# Patient Record
Sex: FEMALE | Race: WHITE | Employment: OTHER | URBAN - METROPOLITAN AREA
[De-identification: names, ages, dates, MRNs, and addresses within clinical notes are randomized per-mention and may not be internally consistent; named-entity substitution may affect disease eponyms.]

---

## 2021-04-22 ENCOUNTER — HOSPITAL ENCOUNTER (EMERGENCY)
Age: 76
Discharge: HOME | End: 2021-04-22
Attending: EMERGENCY MEDICINE
Payer: MEDICARE

## 2021-04-22 ENCOUNTER — APPOINTMENT (OUTPATIENT)
Dept: GENERAL RADIOLOGY | Age: 76
End: 2021-04-22
Payer: MEDICARE

## 2021-04-22 ENCOUNTER — APPOINTMENT (OUTPATIENT)
Dept: CT IMAGING | Age: 76
End: 2021-04-22
Payer: MEDICARE

## 2021-04-22 VITALS
HEART RATE: 68 BPM | HEIGHT: 62 IN | TEMPERATURE: 97.9 F | RESPIRATION RATE: 16 BRPM | OXYGEN SATURATION: 100 % | SYSTOLIC BLOOD PRESSURE: 138 MMHG | DIASTOLIC BLOOD PRESSURE: 65 MMHG | WEIGHT: 178 LBS | BODY MASS INDEX: 32.76 KG/M2

## 2021-04-22 DIAGNOSIS — R79.89 ELEVATED LFTS: ICD-10-CM

## 2021-04-22 DIAGNOSIS — R10.10 PAIN OF UPPER ABDOMEN: Primary | ICD-10-CM

## 2021-04-22 DIAGNOSIS — E83.42 HYPOMAGNESEMIA: ICD-10-CM

## 2021-04-22 LAB
ABSOLUTE EOS #: 0.1 K/UL (ref 0–0.4)
ABSOLUTE IMMATURE GRANULOCYTE: ABNORMAL K/UL (ref 0–0.3)
ABSOLUTE LYMPH #: 1.3 K/UL (ref 1–4.8)
ABSOLUTE MONO #: 0.6 K/UL (ref 0.1–1.2)
ALBUMIN SERPL-MCNC: 4.1 G/DL (ref 3.5–5.2)
ALBUMIN/GLOBULIN RATIO: 1.5 (ref 1–2.5)
ALP BLD-CCNC: 71 U/L (ref 35–104)
ALT SERPL-CCNC: 99 U/L (ref 5–33)
ANION GAP SERPL CALCULATED.3IONS-SCNC: 12 MMOL/L (ref 9–17)
AST SERPL-CCNC: 207 U/L
BASOPHILS # BLD: 1 % (ref 0–2)
BASOPHILS ABSOLUTE: 0.1 K/UL (ref 0–0.2)
BILIRUB SERPL-MCNC: 0.96 MG/DL (ref 0.3–1.2)
BILIRUBIN URINE: NEGATIVE
BUN BLDV-MCNC: 19 MG/DL (ref 8–23)
BUN/CREAT BLD: ABNORMAL (ref 9–20)
CALCIUM SERPL-MCNC: 10.1 MG/DL (ref 8.6–10.4)
CHLORIDE BLD-SCNC: 104 MMOL/L (ref 98–107)
CO2: 21 MMOL/L (ref 20–31)
COLOR: YELLOW
COMMENT UA: ABNORMAL
CREAT SERPL-MCNC: 0.89 MG/DL (ref 0.5–0.9)
D-DIMER QUANTITATIVE: 0.5 MG/L FEU
DIFFERENTIAL TYPE: ABNORMAL
EOSINOPHILS RELATIVE PERCENT: 1 % (ref 1–4)
GFR AFRICAN AMERICAN: >60 ML/MIN
GFR NON-AFRICAN AMERICAN: >60 ML/MIN
GFR SERPL CREATININE-BSD FRML MDRD: ABNORMAL ML/MIN/{1.73_M2}
GFR SERPL CREATININE-BSD FRML MDRD: ABNORMAL ML/MIN/{1.73_M2}
GLUCOSE BLD-MCNC: 180 MG/DL (ref 70–99)
GLUCOSE URINE: ABNORMAL
HCT VFR BLD CALC: 34.9 % (ref 36–46)
HEMOGLOBIN: 11.2 G/DL (ref 12–16)
IMMATURE GRANULOCYTES: ABNORMAL %
KETONES, URINE: NEGATIVE
LEUKOCYTE ESTERASE, URINE: NEGATIVE
LIPASE: 76 U/L (ref 13–60)
LYMPHOCYTES # BLD: 13 % (ref 24–44)
MAGNESIUM: 1.3 MG/DL (ref 1.6–2.6)
MCH RBC QN AUTO: 29.7 PG (ref 26–34)
MCHC RBC AUTO-ENTMCNC: 32 G/DL (ref 31–37)
MCV RBC AUTO: 92.8 FL (ref 80–100)
MONOCYTES # BLD: 6 % (ref 2–11)
NITRITE, URINE: NEGATIVE
NRBC AUTOMATED: ABNORMAL PER 100 WBC
PDW BLD-RTO: 15 % (ref 12.5–15.4)
PH UA: 5.5 (ref 5–8)
PLATELET # BLD: 262 K/UL (ref 140–450)
PLATELET ESTIMATE: ABNORMAL
PMV BLD AUTO: 10 FL (ref 6–12)
POTASSIUM SERPL-SCNC: 4.5 MMOL/L (ref 3.7–5.3)
PROTEIN UA: NEGATIVE
RBC # BLD: 3.77 M/UL (ref 4–5.2)
RBC # BLD: ABNORMAL 10*6/UL
SEG NEUTROPHILS: 79 % (ref 36–66)
SEGMENTED NEUTROPHILS ABSOLUTE COUNT: 7.5 K/UL (ref 1.8–7.7)
SODIUM BLD-SCNC: 137 MMOL/L (ref 135–144)
SPECIFIC GRAVITY UA: 1.01 (ref 1–1.03)
TOTAL PROTEIN: 6.8 G/DL (ref 6.4–8.3)
TROPONIN INTERP: NORMAL
TROPONIN T: NORMAL NG/ML
TROPONIN, HIGH SENSITIVITY: <6 NG/L (ref 0–14)
TURBIDITY: CLEAR
URINE HGB: NEGATIVE
UROBILINOGEN, URINE: NORMAL
WBC # BLD: 9.5 K/UL (ref 3.5–11)
WBC # BLD: ABNORMAL 10*3/UL

## 2021-04-22 PROCEDURE — 96366 THER/PROPH/DIAG IV INF ADDON: CPT

## 2021-04-22 PROCEDURE — 99285 EMERGENCY DEPT VISIT HI MDM: CPT

## 2021-04-22 PROCEDURE — 80053 COMPREHEN METABOLIC PANEL: CPT

## 2021-04-22 PROCEDURE — 83690 ASSAY OF LIPASE: CPT

## 2021-04-22 PROCEDURE — 96365 THER/PROPH/DIAG IV INF INIT: CPT

## 2021-04-22 PROCEDURE — 6370000000 HC RX 637 (ALT 250 FOR IP): Performed by: PHYSICIAN ASSISTANT

## 2021-04-22 PROCEDURE — 93005 ELECTROCARDIOGRAM TRACING: CPT | Performed by: PHYSICIAN ASSISTANT

## 2021-04-22 PROCEDURE — 85379 FIBRIN DEGRADATION QUANT: CPT

## 2021-04-22 PROCEDURE — 36415 COLL VENOUS BLD VENIPUNCTURE: CPT

## 2021-04-22 PROCEDURE — 6360000004 HC RX CONTRAST MEDICATION: Performed by: EMERGENCY MEDICINE

## 2021-04-22 PROCEDURE — 71260 CT THORAX DX C+: CPT

## 2021-04-22 PROCEDURE — 83735 ASSAY OF MAGNESIUM: CPT

## 2021-04-22 PROCEDURE — 85025 COMPLETE CBC W/AUTO DIFF WBC: CPT

## 2021-04-22 PROCEDURE — 2580000003 HC RX 258: Performed by: EMERGENCY MEDICINE

## 2021-04-22 PROCEDURE — 71045 X-RAY EXAM CHEST 1 VIEW: CPT

## 2021-04-22 PROCEDURE — 81003 URINALYSIS AUTO W/O SCOPE: CPT

## 2021-04-22 PROCEDURE — 84484 ASSAY OF TROPONIN QUANT: CPT

## 2021-04-22 PROCEDURE — 6360000002 HC RX W HCPCS: Performed by: PHYSICIAN ASSISTANT

## 2021-04-22 RX ORDER — OMEPRAZOLE 20 MG/1
20 CAPSULE, DELAYED RELEASE ORAL DAILY
COMMUNITY

## 2021-04-22 RX ORDER — LOSARTAN POTASSIUM 100 MG/1
100 TABLET ORAL DAILY
COMMUNITY

## 2021-04-22 RX ORDER — CARVEDILOL 25 MG/1
25 TABLET ORAL 2 TIMES DAILY WITH MEALS
COMMUNITY

## 2021-04-22 RX ORDER — 0.9 % SODIUM CHLORIDE 0.9 %
80 INTRAVENOUS SOLUTION INTRAVENOUS ONCE
Status: COMPLETED | OUTPATIENT
Start: 2021-04-22 | End: 2021-04-22

## 2021-04-22 RX ORDER — FOLIC ACID 1 MG/1
1 TABLET ORAL DAILY
COMMUNITY

## 2021-04-22 RX ORDER — MAGNESIUM SULFATE IN WATER 40 MG/ML
2000 INJECTION, SOLUTION INTRAVENOUS ONCE
Status: COMPLETED | OUTPATIENT
Start: 2021-04-22 | End: 2021-04-22

## 2021-04-22 RX ORDER — SODIUM CHLORIDE 0.9 % (FLUSH) 0.9 %
10 SYRINGE (ML) INJECTION PRN
Status: DISCONTINUED | OUTPATIENT
Start: 2021-04-22 | End: 2021-04-22 | Stop reason: HOSPADM

## 2021-04-22 RX ORDER — MAGNESIUM HYDROXIDE/ALUMINUM HYDROXICE/SIMETHICONE 120; 1200; 1200 MG/30ML; MG/30ML; MG/30ML
30 SUSPENSION ORAL ONCE
Status: COMPLETED | OUTPATIENT
Start: 2021-04-22 | End: 2021-04-22

## 2021-04-22 RX ORDER — ASPIRIN 81 MG/1
81 TABLET ORAL NIGHTLY
COMMUNITY

## 2021-04-22 RX ORDER — AMLODIPINE BESYLATE 10 MG/1
10 TABLET ORAL DAILY
COMMUNITY

## 2021-04-22 RX ADMIN — ALUMINUM HYDROXIDE, MAGNESIUM HYDROXIDE, AND SIMETHICONE 30 ML: 200; 200; 20 SUSPENSION ORAL at 17:47

## 2021-04-22 RX ADMIN — MAGNESIUM SULFATE 2000 MG: 2 INJECTION INTRAVENOUS at 17:44

## 2021-04-22 RX ADMIN — IOPAMIDOL 75 ML: 755 INJECTION, SOLUTION INTRAVENOUS at 17:30

## 2021-04-22 RX ADMIN — SODIUM CHLORIDE 80 ML: 9 INJECTION, SOLUTION INTRAVENOUS at 17:30

## 2021-04-22 RX ADMIN — SODIUM CHLORIDE, PRESERVATIVE FREE 10 ML: 5 INJECTION INTRAVENOUS at 17:30

## 2021-04-22 ASSESSMENT — PAIN DESCRIPTION - LOCATION: LOCATION: ABDOMEN;BACK

## 2021-04-22 ASSESSMENT — PAIN SCALES - GENERAL: PAINLEVEL_OUTOF10: 4

## 2021-04-22 NOTE — ED PROVIDER NOTES
Attending Supervisory Note/Shared Visit   I have personally performed a face to face diagnostic evaluation on this patient. I have reviewed the mid-levels findings and agree.         (Please note that portions of this note were completed with a voice recognition program.  Efforts were made to edit the dictations but occasionally words are mis-transcribed.)    Elisabeth Lemon MD  Attending Emergency Physician        Elisabeth Lemon MD  04/22/21 [id number]

## 2021-04-22 NOTE — ED PROVIDER NOTES
[id number] [facility] ED  [address] [facility] [address] [facility] [place] [address]  Phone: [phone]  Fax: [phone]        Pt Name: Stella Paz  MRN: [id number]  [place] 1945  Date of evaluation: 4/22/21    [address] [place] [address]       Chief Complaint   Patient presents with    Abdominal Pain     radiates to back worse today than in previous episodes       HISTORY OF PRESENT ILLNESS (Location/Symptom, Timing/Onset, Context/Setting, Quality, Duration, Modifying Factors, Severity)      Stella Paz is a 68 y.o. female with PMH of hypertension, type 2 diabetes, psoriatic arthritis, kidney stones who presents to the ED via private auto with abdominal pain and back pain. Patient reports that for the past 2 weeks she has been experiencing intermittent epigastric abdominal pain that radiates into her back. She reports that the pain is occasionally sharp and burning but also feels like a pressure. She reports that she has had this pain in the past however this episode today feels much more severe as the pain has notably worsened since she awoke and has become constant. Denies any pain in her chest, shortness of breath, cough, vomiting, nausea, or diarrhea. Denies any fever, chills, or body aches. She has continued to eat however does notice that the pain is slightly worsened after eating. Patient did not notice any other exacerbating or alleviating factors. Denies any changes with exertion. Denies any cardiac history. She notes that she is also under a lot of stress due to her  recently being diagnosed with cancer and the PET scan in a 2 days. Patient also notes Alba Vaughan it is just anxiety due to the stress. \"  Denies any history of being treated for anxiety, but does note that she does experience this occasionally and more frequently as of now. Denies any other concerns at this time.     PAST MEDICAL / SURGICAL / SOCIAL / FAMILY HISTORY     PMH:  has a past medical history of Diabetes mellitus ([address] and Kidney stones. Surgical History:  has a past surgical history that includes Hysterectomy. Social History:  reports that she has never smoked. She has never used smokeless tobacco. She reports previous alcohol use. She reports that she does not use drugs. Family History: has no family status information on file. family history is not on file. Psychiatric History: None    Allergies: Sulfa antibiotics    Home Medications:   Prior to Admission medications    Medication Sig Start Date End Date Taking? Authorizing Provider   metFORMIN (GLUCOPHAGE) 500 MG tablet Take 2 tablets by mouth nightly    Historical Provider, MD   amLODIPine (NORVASC) 10 MG tablet Take 10 mg by mouth daily    Historical Provider, MD   losartan (COZAAR) 100 MG tablet Take 100 mg by mouth daily    Historical Provider, MD   carvedilol (COREG) 25 MG tablet Take 25 mg by mouth 2 times daily (with meals)    Historical Provider, MD   folic acid (FOLVITE) 1 MG tablet Take 1 tablet by mouth daily    Historical Provider, MD   methotrexate (RHEUMATREX) 2.5 MG chemo tablet Take 2.5 mg by mouth once a week    Historical Provider, MD   omeprazole (PRILOSEC) 20 MG delayed release capsule Take 20 mg by mouth daily    Historical Provider, MD   aspirin 81 MG EC tablet Take 81 mg by mouth nightly    Historical Provider, MD       REVIEW OF SYSTEMS  (2-9 systems for level 4, 10 ormore for level 5)      Review of Systems    Constitutional: See HPI. Eyes: Denies vision changes. HENT: Denies sore throat or neck pain. Respiratory: See HPI. Cardiovascular: See HPI. GI: See HPI. : See HPI. Musculoskeletal: Denies recent trauma. Skin: Denies new rashes or wounds. Neurologic:  Denies new numbness or weakness. Diabetic eye  Psychiatric: Denies sleep disturbances. Presently  All other systems negative except as marked.      PHYSICAL EXAM  (up to 7 for level 4, 8 or more for level 5)      INITIAL VITALS:  height is 5' 2\" (1.575 m) and weight is 80.7 kg (178 lb). Her oral temperature is 97.9 °F (36.6 °C). Her blood pressure is 138/65 and her pulse is 68. Her respiration is 16 and oxygen saturation is 100%. Vital signs reviewed. Physical Exam    General:  Alert, cooperative, well-groomed, well-nourished, appears stated age, and is in no acute distress. Head:  Normocephalic, atraumatic, and without obvious abnormality. Eyes:  Sclerae/conjunctivae clear without injection, pallor, or icterus. Corneas clear without opacities. EOM's intact. ENT: Ears and nose are all without obvious masses lesion or deformity. No oropharynx examination performed due to aerosolization risk during COVID-19 pandemic. Neck: Supple and symmetrical. Trachea midline. No adenopathy. No jugular venous distention. Lungs:   No respiratory distress. Clear to auscultation bilaterally. No wheezes, rhonchi, or rales. Heart:  Regular rate. Regular rhythm. No murmurs, rubs, or gallops. Abdomen:   Normoactive bowel sounds. There is tenderness to palpation of the epigastrium that is reproducible as well as tenderness to the right and left upper quadrants. There is no guarding or rebound. Abdomen is soft and nondistended. No palpable masses. No CVA tenderness. Back: There is no abnormality of the upper back and no tenderness palpation. No midline spine tenderness. Extremities: Warm and dry without erythema or edema. Skin: Soft, good turgor, and well-hydrated. No obvious rashes or lesions. Neurologic: GCS is 15 and no focal deficits are appreciated. Normal gait. Grossly normal motor and sensation. Speech clear. Psychiatric: Normal mood and affect. Normal behavior. Coherent thought process. DIFFERENTIAL DIAGNOSIS / MDM     The patient presents to the Emergency Department with epigastric abdominal pain that radiates into the upper back. Vital signs are stable with slight hypertension at 138/66. Physical exam demonstrates a well-appearing nontoxic female in no acute distress. There is reproducible tenderness to the epigastrium without guarding or rebound. I did consider life-threatening cardiac and pulmonary etiologies but I suspect that this is likely GI related, possibly gastritis, but will plan to obtain pertinent labs, EKG, chest x-ray versus CT scan chest, and scan of the abdomen and reassess symptoms. EKG did not show acute changes. Will give fluids and likely GI cocktail pending results. PLAN (LABS / IMAGING / EKG):  Orders Placed This Encounter   Procedures    XR CHEST PORTABLE    CT CHEST ABDOMEN PELVIS W CONTRAST    CBC Auto Differential    Comprehensive Metabolic Panel    Magnesium    Troponin    D-Dimer, Quantitative    Lipase    Urinalysis Reflex to Culture    EKG 12 Lead       MEDICATIONS ORDERED:  Orders Placed This Encounter   Medications    magnesium sulfate 2000 mg in 50 mL IVPB premix    0.9 % sodium chloride bolus    DISCONTD: sodium chloride flush 0.9 % injection 10 mL    iopamidol (ISOVUE-370) 76 % injection 75 mL    aluminum & magnesium hydroxide-simethicone (MAALOX) 200-200-20 MG/5ML suspension 30 mL       Controlled Substances Monitoring:     DIAGNOSTIC RESULTS     EKG: All EKG's are interpreted by the Emergency Department Physician who either signs or Co-signs this chart in the absenceof a cardiologist.    EKG Interpretation    Interpreted by emergency department physician    Rhythm: normal sinus   Rate: 66  Axis: Normal  Ectopy: none  Conduction: normal  ST Segments: nonspecific changes  T Waves: non specific changes; multiple inversions  Q Waves: nonspecific    Clinical Impression: non-specific EKG. No acute ischemia/infarction noted    Heather Montero    RADIOLOGY:  Non-plain film images such as CT, Ultrasound and MRI are read by the radiologist. Plain radiographic images are visualized by me and the following radiologist interpretations are reviewed.     Xr Chest Portable    Result Date: 4/22/2021  EXAMINATION: ONE XRAY VIEW OF THE CHEST 4/22/2021 5:03 pm COMPARISON: None. HISTORY: ORDERING SYSTEM PROVIDED HISTORY: upper abdominal pain into back x2 weeks, worse today TECHNOLOGIST PROVIDED HISTORY: upper abdominal pain into back x2 weeks, worse today Reason for Exam: abd pain Acuity: Acute Type of Exam: Initial Additional signs and symptoms: radiates to her back Relevant Medical/Surgical History: hx DM FINDINGS: Cardiomediastinal silhouette is normal in size. No pulmonary consolidation, pleural effusion, or pneumothorax. No acute osseous abnormality. No acute cardiopulmonary abnormality. Ct Chest Abdomen Pelvis W Contrast    Result Date: 4/22/2021  EXAMINATION: CT OF THE CHEST, ABDOMEN, AND PELVIS WITH CONTRAST 4/22/2021 5:27 pm TECHNIQUE: CT of the chest, abdomen and pelvis was performed with the administration of intravenous contrast. Multiplanar reformatted images are provided for review. Dose modulation, iterative reconstruction, and/or weight based adjustment of the mA/kV was utilized to reduce the radiation dose to as low as reasonably achievable. COMPARISON: None HISTORY: ORDERING SYSTEM PROVIDED HISTORY: diffuse upper abdominal pain radiating into back x2 weeks TECHNOLOGIST PROVIDED HISTORY: diffuse upper abdominal pain radiating into back x2 weeks Decision Support Exception->Emergency Medical Condition (MA) Reason for Exam: Diffuse upper abd pain radiating into back for 2 weeks. Hx of diabetes Acuity: Unknown Type of Exam: Unknown FINDINGS: Chest: Mediastinum: The heart is mildly enlarged without pericardial effusion. The thoracic aorta and main pulmonary artery are normal in caliber. There are shotty mediastinal lymph nodes. Lungs/pleura: There is no pleural effusion. There is no pneumothorax. There is no pulmonary consolidation. There is a 7 mm perifissural nodule in the right upper lobe. Soft Tissues/Bones: There is no acute osseous abnormality. Subcentimeter bilateral thyroid nodules are noted.  Abdomen/Pelvis: Organs: The liver is diffusely hypoattenuating. The gallbladder is partially distended without radiopaque cholelithiasis identified. Common bile duct is upper limits of normal in size, measuring 6 mm. No acute abnormality within the spleen, pancreas, or adrenal glands. There is a 3.4 cm simple appearing left renal cyst.  Additional subcentimeter hypoattenuating renal lesions are too small to accurately characterize. GI/Bowel: The stomach is partially distended with small hiatal hernia visualized. The small bowel is nondilated. The colon is nondistended with a few scattered, noninflamed diverticula. The appendix is within normal limits. Pelvis: The bladder is partially distended without vesicular stone. Prior hysterectomy. Peritoneum/Retroperitoneum: No ascites or pneumoperitoneum. Atherosclerosis of the nondilated abdominal aorta. There is a small, fat containing umbilical hernia. Bones/Soft Tissues: No acute osseous abnormality. 1. No acute intrathoracic abnormality. 2. 7 mm perifissural nodule in the right upper lobe. 3. No acute intra-abdominal abnormality. 4. Hepatic steatosis. 5. Diverticulosis without diverticulitis. 6. Subcentimeter bilateral thyroid nodules. RECOMMENDATIONS: 7 mm right pulmonary nodule consistent with an intrapulmonary lymph node within the upper lobe. No routine follow-up imaging is recommended. These guidelines do not apply to immunocompromised patients and patients with cancer. Follow up in patients with significant comorbidities as clinically warranted. For lung cancer screening, adhere to Lung-RADS guidelines. Reference: Radiology. 2017; 284(1):228-43; Radiology. 2012; 265(2):611-6; Radiology. 2010; 254(3):949-56.        LABS:  Results for orders placed or performed during the hospital encounter of 04/22/21   CBC Auto Differential   Result Value Ref Range    WBC 9.5 3.5 - 11.0 k/uL    RBC 3.77 (L) 4.0 - 5.2 m/uL    Hemoglobin 11.2 (L) 12.0 - 16.0 g/dL    Hematocrit 34.9 (L) 36 - 46 %    MCV 92.8 80 - 100 fL    MCH 29.7 26 - 34 pg    MCHC 32.0 31 - 37 g/dL    RDW 15.0 12.5 - 15.4 %    Platelets 661 215 - 405 k/uL    MPV 10.0 6.0 - 12.0 fL    NRBC Automated NOT REPORTED per 100 WBC    Differential Type NOT REPORTED     Seg Neutrophils 79 (H) 36 - 66 %    Lymphocytes 13 (L) 24 - 44 %    Monocytes 6 2 - 11 %    Eosinophils % 1 1 - 4 %    Basophils 1 0 - 2 %    Immature Granulocytes NOT REPORTED 0 %    Segs Absolute 7.50 1.8 - 7.7 k/uL    Absolute Lymph # 1.30 1.0 - 4.8 k/uL    Absolute Mono # 0.60 0.1 - 1.2 k/uL    Absolute Eos # 0.10 0.0 - 0.4 k/uL    Basophils Absolute 0.10 0.0 - 0.2 k/uL    Absolute Immature Granulocyte NOT REPORTED 0.00 - 0.30 k/uL    WBC Morphology NOT REPORTED     RBC Morphology NOT REPORTED     Platelet Estimate NOT REPORTED    Comprehensive Metabolic Panel   Result Value Ref Range    Glucose 180 (H) 70 - 99 mg/dL    BUN 19 8 - 23 mg/dL    CREATININE 0.89 0.50 - 0.90 mg/dL    Bun/Cre Ratio NOT REPORTED 9 - 20    Calcium 10.1 8.6 - 10.4 mg/dL    Sodium 137 135 - 144 mmol/L    Potassium 4.5 3.7 - 5.3 mmol/L    Chloride 104 98 - 107 mmol/L    CO2 21 20 - 31 mmol/L    Anion Gap 12 9 - 17 mmol/L    Alkaline Phosphatase 71 35 - 104 U/L    ALT 99 (H) 5 - 33 U/L     (H) <32 U/L    Total Bilirubin 0.96 0.3 - 1.2 mg/dL    Total Protein 6.8 6.4 - 8.3 g/dL    Albumin 4.1 3.5 - 5.2 g/dL    Albumin/Globulin Ratio 1.5 1.0 - 2.5    GFR Non-African American >60 >60 mL/min    GFR African American >60 >60 mL/min    GFR Comment          GFR Staging NOT REPORTED    Magnesium   Result Value Ref Range    Magnesium 1.3 (L) 1.6 - 2.6 mg/dL   Troponin   Result Value Ref Range    Troponin, High Sensitivity <6 0 - 14 ng/L    Troponin T NOT REPORTED <0.03 ng/mL    Troponin Interp NOT REPORTED    D-Dimer, Quantitative   Result Value Ref Range    D-Dimer, Quant 0.50 mg/L FEU   Lipase   Result Value Ref Range    Lipase 76 (H) 13 - 60 U/L   Urinalysis Reflex to Culture    Specimen: Urine, clean catch   Result Value Ref Range    Color, UA YELLOW YELLOW    Turbidity UA CLEAR CLEAR    Glucose, Ur TRACE (A) NEGATIVE    Bilirubin Urine NEGATIVE NEGATIVE    Ketones, Urine NEGATIVE NEGATIVE    Specific Gravity, UA 1.006 1.005 - 1.030    Urine Hgb NEGATIVE NEGATIVE    pH, UA 5.5 5.0 - 8.0    Protein, UA NEGATIVE NEGATIVE    Urobilinogen, Urine Normal Normal    Nitrite, Urine NEGATIVE NEGATIVE    Leukocyte Esterase, Urine NEGATIVE NEGATIVE    Urinalysis Comments       Microscopic exam not performed based on chemical results unless requested in original order. Urinalysis Comments          Urinalysis Comments       Utilizing a urinalysis as the only screening method to exclude a potential uropathogen can be unreliable in many patient populations. Rapid screening tests are less sensitive than culture and if UTI is a clinical possibility, culture should be considered despite a negative urinalysis. EKG 12 Lead   Result Value Ref Range    Ventricular Rate 66 BPM    Atrial Rate 66 BPM    P-R Interval 148 ms    QRS Duration 84 ms    Q-T Interval 416 ms    QTc Calculation (Bazett) 436 ms    P Axis 18 degrees    R Axis 0 degrees    T Axis 23 degrees       EMERGENCY DEPARTMENT COURSE     ED Course as of Apr 25 2034   Thu Apr 22, 2021   1649 CBC demonstrates hgb of 11.2 with associated hematocrit shift. Ddimer is negative per age adjusted criteria and will obtain CXR. [MG]   4636 Chest x-ray was unremarkable. Troponin is normal. BMP demonstrates elevated glucose at 180 and patient is a type II diabetic, similar to baseline. Significant elevation and AST at 207 with elevation of ALT at 99. Lipase slightly elevated at 76. Magnesium is 1.3 and will give additional magnesium. Patient was updated regarding these results and the elevated LFTs are likely secondary to her autoimmune disease and methotrexate/folic acid use and this could possibly causing her hypomagnesia as well.   Patient is not an alcoholic and does (e.g., fever, changing or worsening pain, persistent vomiting, bloody stools, chest pain, shortness of breath, numbness or weakness to the arms or legs, coolness or color change to the arms or legs) that necessitate immediate return. The patient understands that at this time there is no evidence for a more malignant underlying process, but the patient also understands that early in the process of an illness or injury, an emergency department workup can be falsely reassuring. Routine discharge counseling was given, and the patient understands that worsening, changing or persistent symptoms should prompt an immediate call or follow up with their primary physician or return to the emergency department. The importance of appropriate follow up was also discussed. I have reviewed the disposition diagnosis with the patient and or their family/guardian. I have answered their questions and given discharge instructions. They voiced understanding of these instructions and did not have any further questions or complaints. This patient was seen by the attending physician and they agreed with the assessment and plan. Patient will be d/c'ed home after magnesium. CONSULTS:  None    PROCEDURES:  None    FINAL IMPRESSION      1. Pain of upper abdomen    2. Hypomagnesemia    3. Elevated LFTs          DISPOSITION / PLAN     CONDITION ON DISPOSITION:   Good / Stable for discharge.      PATIENT REFERRED TO:  Mariya Bergeron MD  [address] [phone]    In 2 days  To schedule an appointment for follow-up      DISCHARGE MEDICATIONS:  Discharge Medication List as of 4/22/2021  8:25 PM          [facility]   Emergency Medicine Physician Assistant    (Please note that portions of this note were completed with a voice recognition program.  Efforts were made to edit the dictations but occasionally words aremis-transcribed.)        Matthew Ledezma PA-C  04/25/21 2034       Matthew Ledezma TOYIN  04/25/21 2034

## 2021-04-24 LAB
EKG ATRIAL RATE: 66 BPM
EKG P AXIS: 18 DEGREES
EKG P-R INTERVAL: 148 MS
EKG Q-T INTERVAL: 416 MS
EKG QRS DURATION: 84 MS
EKG QTC CALCULATION (BAZETT): 436 MS
EKG R AXIS: 0 DEGREES
EKG T AXIS: 23 DEGREES
EKG VENTRICULAR RATE: 66 BPM

## 2024-05-08 ENCOUNTER — TELEPHONE (OUTPATIENT)
Dept: SURGERY | Age: 79
End: 2024-05-08

## 2024-05-09 NOTE — TELEPHONE ENCOUNTER
[facility] Surgery  Screening colonoscopy questionnaire  Karen Kruktylor    Pt Name: Khadra Galeana  MRN: [id number]  YOB: 1945  Primary Care Physician: Katelyn iMchael MD      Have you ever had a colonoscopy? Yes  When was your last colonoscopy? 2015  Have you ever had polyps or abnormal findings on past colonoscopies No    Have you recently had a stool test to check for colon cancer? No  Was it positive?  na    Do you have any family history of colon cancer? Yes  If yes, which family member had colon cancer? Paternal grandmother  Were they diagnosed younger or older than the age of 60? over age of 60yrs    Do you have a history of Crohn's disease or Ulcerative Colitis? No    Do you have a history of constipation? No    Do you have a history of bloody or black stools? No    Have you ever had surgery done inside your abdomen? Yes  What surgery? gallbladder    8. Are you taking any blood thinners? No   If yes, what is the name of the blood thinner? na    Scheduling:  Based on the information provided above, patient does not qualify for colonoscopy till 2025.  Only first-degree relatives accounted for family history.